# Patient Record
Sex: MALE | Race: WHITE
[De-identification: names, ages, dates, MRNs, and addresses within clinical notes are randomized per-mention and may not be internally consistent; named-entity substitution may affect disease eponyms.]

---

## 2017-03-17 NOTE — REP
ESOPHAGRAM, AIR CONTRAST:

 

The procedure was performed under the direct supervision of Dr. Garcia. The images

were reviewed with Dr. Garcia.

 

A single view PA chest x-ray is submitted as a  film. There is no change

compared to a previous chest x-ray performed on 03/11/2014.

 

Liquid barium and gas-producing granules were given in the erect position as well

as liquid barium in the prone oblique positions in order to perform a double

contrast esophagram examination.

 

The oral and pharyngeal stages of deglutition are unremarkable. During esophageal

transport there are tertiary waves demonstrated. There is no esophagitis,

stricture, mucosal ring or hiatal hernia. Gastroesophageal reflux is not

demonstrated on this examination.

 

IMPRESSION:

 

Esophageal dysmotility, otherwise unremarkable double contrast esophagram

examination.

 

48 seconds of fluoroscopy time was utilized for this procedure.

 

 

 

 

Reviewed by

GLADYS Leos 03/20/2017 04:58 PEdited and Signed by

Cipriano Garcia MD 03/21/2017 03:01 P

## 2019-02-10 ENCOUNTER — HOSPITAL ENCOUNTER (EMERGENCY)
Dept: HOSPITAL 53 - M ED | Age: 69
Discharge: HOME | End: 2019-02-10
Payer: MEDICARE

## 2019-02-10 VITALS — HEIGHT: 72 IN | BODY MASS INDEX: 25.77 KG/M2 | WEIGHT: 190.26 LBS

## 2019-02-10 VITALS — SYSTOLIC BLOOD PRESSURE: 122 MMHG | DIASTOLIC BLOOD PRESSURE: 87 MMHG

## 2019-02-10 DIAGNOSIS — I25.2: ICD-10-CM

## 2019-02-10 DIAGNOSIS — I11.0: ICD-10-CM

## 2019-02-10 DIAGNOSIS — E11.9: ICD-10-CM

## 2019-02-10 DIAGNOSIS — Z79.899: ICD-10-CM

## 2019-02-10 DIAGNOSIS — R94.31: ICD-10-CM

## 2019-02-10 DIAGNOSIS — Z95.5: ICD-10-CM

## 2019-02-10 DIAGNOSIS — R06.00: Primary | ICD-10-CM

## 2019-02-10 DIAGNOSIS — I50.9: ICD-10-CM

## 2019-02-10 DIAGNOSIS — Z79.84: ICD-10-CM

## 2019-02-10 LAB
ALBUMIN SERPL BCG-MCNC: 4.2 GM/DL (ref 3.2–5.2)
ALT SERPL W P-5'-P-CCNC: 37 U/L (ref 12–78)
APTT BLD: 24.7 SECONDS (ref 25.4–37.6)
BASOPHILS # BLD AUTO: 0.1 10^3/UL (ref 0–0.2)
BASOPHILS NFR BLD AUTO: 0.7 % (ref 0–1)
BILIRUB CONJ SERPL-MCNC: 0.2 MG/DL (ref 0–0.2)
BILIRUB SERPL-MCNC: 0.7 MG/DL (ref 0.2–1)
BUN SERPL-MCNC: 25 MG/DL (ref 7–18)
CALCIUM SERPL-MCNC: 9.1 MG/DL (ref 8.8–10.2)
CHLORIDE SERPL-SCNC: 104 MEQ/L (ref 98–107)
CK MB CFR.DF SERPL CALC: 1.86
CK MB CFR.DF SERPL CALC: 2.55
CK MB SERPL-MCNC: 1 NG/ML (ref ?–3.6)
CK MB SERPL-MCNC: 1 NG/ML (ref ?–3.6)
CK SERPL-CCNC: 47 U/L (ref 39–308)
CK SERPL-CCNC: 59 U/L (ref 39–308)
CO2 SERPL-SCNC: 28 MEQ/L (ref 21–32)
CREAT SERPL-MCNC: 0.98 MG/DL (ref 0.7–1.3)
EOSINOPHIL # BLD AUTO: 0.2 10^3/UL (ref 0–0.5)
EOSINOPHIL NFR BLD AUTO: 2.3 % (ref 0–3)
GFR SERPL CREATININE-BSD FRML MDRD: > 60 ML/MIN/{1.73_M2} (ref 49–?)
GLUCOSE SERPL-MCNC: 185 MG/DL (ref 70–100)
HCT VFR BLD AUTO: 50.6 % (ref 42–52)
HGB BLD-MCNC: 17.7 G/DL (ref 13.5–17.5)
INR PPP: 1.02
LIPASE SERPL-CCNC: 187 U/L (ref 73–393)
LYMPHOCYTES # BLD AUTO: 1.9 10^3/UL (ref 1.5–4.5)
LYMPHOCYTES NFR BLD AUTO: 26.5 % (ref 24–44)
MCH RBC QN AUTO: 34.4 PG (ref 27–33)
MCHC RBC AUTO-ENTMCNC: 35 G/DL (ref 32–36.5)
MCV RBC AUTO: 98.4 FL (ref 80–96)
MONOCYTES # BLD AUTO: 0.6 10^3/UL (ref 0–0.8)
MONOCYTES NFR BLD AUTO: 8.8 % (ref 0–5)
NEUTROPHILS # BLD AUTO: 4.4 10^3/UL (ref 1.8–7.7)
NEUTROPHILS NFR BLD AUTO: 60.9 % (ref 36–66)
NT-PRO BNP: 37 PG/ML (ref ?–125)
PLATELET # BLD AUTO: 160 10^3/UL (ref 150–450)
POTASSIUM SERPL-SCNC: 4.1 MEQ/L (ref 3.5–5.1)
PROT SERPL-MCNC: 6.9 GM/DL (ref 6.4–8.2)
PROTHROMBIN TIME: 13.5 SECONDS (ref 12.1–14.4)
RBC # BLD AUTO: 5.14 10^6/UL (ref 4.3–6.1)
SODIUM SERPL-SCNC: 141 MEQ/L (ref 136–145)
T4 FREE SERPL-MCNC: 1.07 NG/DL (ref 0.76–1.46)
TROPONIN I SERPL-MCNC: < 0.02 NG/ML (ref ?–0.1)
TROPONIN I SERPL-MCNC: < 0.02 NG/ML (ref ?–0.1)
TSH SERPL DL<=0.005 MIU/L-ACNC: 1.48 UIU/ML (ref 0.36–3.74)
WBC # BLD AUTO: 7.3 10^3/UL (ref 4–10)

## 2019-02-10 PROCEDURE — 96360 HYDRATION IV INFUSION INIT: CPT

## 2019-02-10 PROCEDURE — 82553 CREATINE MB FRACTION: CPT

## 2019-02-10 PROCEDURE — 84484 ASSAY OF TROPONIN QUANT: CPT

## 2019-02-10 PROCEDURE — 71275 CT ANGIOGRAPHY CHEST: CPT

## 2019-02-10 PROCEDURE — 85025 COMPLETE CBC W/AUTO DIFF WBC: CPT

## 2019-02-10 PROCEDURE — 80048 BASIC METABOLIC PNL TOTAL CA: CPT

## 2019-02-10 PROCEDURE — 85730 THROMBOPLASTIN TIME PARTIAL: CPT

## 2019-02-10 PROCEDURE — 71046 X-RAY EXAM CHEST 2 VIEWS: CPT

## 2019-02-10 PROCEDURE — 94760 N-INVAS EAR/PLS OXIMETRY 1: CPT

## 2019-02-10 PROCEDURE — 96361 HYDRATE IV INFUSION ADD-ON: CPT

## 2019-02-10 PROCEDURE — 84439 ASSAY OF FREE THYROXINE: CPT

## 2019-02-10 PROCEDURE — 80076 HEPATIC FUNCTION PANEL: CPT

## 2019-02-10 PROCEDURE — 83880 ASSAY OF NATRIURETIC PEPTIDE: CPT

## 2019-02-10 PROCEDURE — 82550 ASSAY OF CK (CPK): CPT

## 2019-02-10 PROCEDURE — 99285 EMERGENCY DEPT VISIT HI MDM: CPT

## 2019-02-10 PROCEDURE — 83690 ASSAY OF LIPASE: CPT

## 2019-02-10 PROCEDURE — 93041 RHYTHM ECG TRACING: CPT

## 2019-02-10 PROCEDURE — 36415 COLL VENOUS BLD VENIPUNCTURE: CPT

## 2019-02-10 PROCEDURE — 93005 ELECTROCARDIOGRAM TRACING: CPT

## 2019-02-10 PROCEDURE — 85610 PROTHROMBIN TIME: CPT

## 2019-02-10 PROCEDURE — 84443 ASSAY THYROID STIM HORMONE: CPT

## 2019-02-10 NOTE — REP
CT ANGIOGRAM OF THE CHEST:

 

TECHNIQUE:  Axial contrast enhanced images from the thoracic inlet to the upper

abdomen using 100 mL Isovue 370 intravenous contrast material with multiplanar

reformations.

 

There is no CT evidence of pulmonary embolism or aortic dissection.  Mild

atherosclerotic calcifications are seen of the thoracic aorta.  The heart is

normal in size.  No adenopathy is seen in the chest.  There is no pleural or

pericardial effusion.  There is chronic elevation of the right hemidiaphragm with

mild right lower lobe fibroatelectasis.

 

IMPRESSION:

 

No CT evidence of pulmonary embolism or aortic dissection.  Chronically elevated

right hemidiaphragm with mild adjacent right lower lobe fibroatelectasis.

 

 

Electronically Signed by

Roland Severino MD 02/10/2019 07:07 P

## 2019-02-10 NOTE — REP
CHEST, TWO VIEWS:

 

COMPARISON:  03/17/2017.

 

There is no acute infiltrate or pulmonary edema.  There is chronic elevation of

the right hemidiaphragm.  Heart is normal in size and the mediastinal silhouette

is unremarkable.  There are minor degenerative changes of the spine.

 

IMPRESSION:

 

No active pulmonary disease.

 

 

Electronically Signed by

Roland Severino MD 02/10/2019 07:04 P

## 2019-02-10 NOTE — ECGEPIP
Stationary ECG Study

                           [facility] - ED

                                       

                                       Test Date:    2019-02-10

Pat Name:     SAADIA GUZMÁN              Department:   

Patient ID:   [id number]                 Room:         -

Gender:       M                        Technician:   ARCADIO

:          1950               Requested By: Maja Lundborg-Gray 

Order Number: [id number]     Reading MD:   Maja Lundborg-Gray

                                 Measurements

Intervals                              Axis          

Rate:         70                       P:            5

HI:           159                      QRS:          13

QRSD:         103                      T:            82

QT:           375                                    

QTc:          406                                    

                           Interpretive Statements

SINUS RHYTHM

NONSPECIFIC ST & T-WAVE ABNORMALITY

INFERIOR WALL MI AGE UNDETERMINED

 

NO OLD ECG FOR COMPARISON

Electronically Signed On 2- 19:45:07 EST by Maja Lundborg-Gray

## 2019-02-10 NOTE — ECGEPIP
Stationary ECG Study

                           [facility] - ED

                                       

                                       Test Date:    2019-02-10

Pat Name:     SAADIA GUZMÁN              Department:   

Patient ID:   [id number]                 Room:         -

Gender:       M                        Technician:   SUE

:          1950               Requested By: Maja Lundborg-Gray 

Order Number: [id number]     Reading MD:   Maja Lundborg-Gray

                                 Measurements

Intervals                              Axis          

Rate:         70                       P:            -3

NC:           162                      QRS:          -10

QRSD:         99                       T:            28

QT:           384                                    

QTc:          415                                    

                           Interpretive Statements

SINUS RHYTHM

NONSPECIFIC ST & T-WAVE ABNORMALITY

LEFTWARD AXIS

INFERIOR WALL MI AGE UNDETERMINED

 

CW 2/10/19 SIMILAR

Electronically Signed On 2- 19:50:17 EST by Maja Lundborg-Gray